# Patient Record
Sex: FEMALE | Race: BLACK OR AFRICAN AMERICAN | NOT HISPANIC OR LATINO | URBAN - METROPOLITAN AREA
[De-identification: names, ages, dates, MRNs, and addresses within clinical notes are randomized per-mention and may not be internally consistent; named-entity substitution may affect disease eponyms.]

---

## 2017-04-20 ENCOUNTER — OFFICE (OUTPATIENT)
Dept: URBAN - METROPOLITAN AREA CLINIC 19 | Facility: CLINIC | Age: 67
End: 2017-04-20

## 2017-04-20 VITALS
HEART RATE: 75 BPM | HEIGHT: 65 IN | WEIGHT: 181 LBS | SYSTOLIC BLOOD PRESSURE: 132 MMHG | DIASTOLIC BLOOD PRESSURE: 89 MMHG

## 2017-04-20 DIAGNOSIS — E66.3 OVERWEIGHT: ICD-10-CM

## 2017-04-20 DIAGNOSIS — Z86.010 PERSONAL HISTORY OF COLONIC POLYPS: ICD-10-CM

## 2017-04-20 DIAGNOSIS — K21.9 GASTRO-ESOPHAGEAL REFLUX DISEASE WITHOUT ESOPHAGITIS: ICD-10-CM

## 2017-04-20 DIAGNOSIS — R94.5 ABNORMAL RESULTS OF LIVER FUNCTION STUDIES: ICD-10-CM

## 2017-04-20 DIAGNOSIS — I10 ESSENTIAL (PRIMARY) HYPERTENSION: ICD-10-CM

## 2017-04-20 DIAGNOSIS — R10.13 EPIGASTRIC PAIN: ICD-10-CM

## 2017-04-20 PROCEDURE — 99214 OFFICE O/P EST MOD 30 MIN: CPT | Performed by: INTERNAL MEDICINE

## 2017-04-20 RX ORDER — SODIUM PICOSULFATE, MAGNESIUM OXIDE, AND ANHYDROUS CITRIC ACID 10; 3.5; 12 MG/16.1G; G/16.1G; G/16.1G
POWDER, METERED ORAL
Qty: 1 | Refills: 0 | Status: COMPLETED
Start: 2017-04-20 | End: 2017-06-28

## 2017-04-22 LAB — CEA: 0.8 NG/ML

## 2017-04-26 ENCOUNTER — OFFICE (OUTPATIENT)
Dept: URBAN - METROPOLITAN AREA CLINIC 22 | Facility: CLINIC | Age: 67
End: 2017-04-26

## 2017-04-26 DIAGNOSIS — R94.5 ABNORMAL RESULTS OF LIVER FUNCTION STUDIES: ICD-10-CM

## 2017-04-26 DIAGNOSIS — R10.13 EPIGASTRIC PAIN: ICD-10-CM

## 2017-04-26 PROCEDURE — 74177 CT ABD & PELVIS W/CONTRAST: CPT | Performed by: INTERNAL MEDICINE

## 2017-06-28 ENCOUNTER — OFFICE (OUTPATIENT)
Dept: URBAN - METROPOLITAN AREA CLINIC 11 | Facility: CLINIC | Age: 67
End: 2017-06-28

## 2017-06-28 ENCOUNTER — AMBULATORY SURGICAL CENTER (OUTPATIENT)
Dept: URBAN - METROPOLITAN AREA SURGERY 2 | Facility: SURGERY | Age: 67
End: 2017-06-28

## 2017-06-28 ENCOUNTER — AMBULATORY SURGICAL CENTER (OUTPATIENT)
Dept: URBAN - METROPOLITAN AREA SURGERY 2 | Facility: SURGERY | Age: 67
End: 2017-06-28
Payer: MEDICARE

## 2017-06-28 VITALS
SYSTOLIC BLOOD PRESSURE: 98 MMHG | HEIGHT: 65 IN | WEIGHT: 184 LBS | DIASTOLIC BLOOD PRESSURE: 50 MMHG | SYSTOLIC BLOOD PRESSURE: 85 MMHG | OXYGEN SATURATION: 95 % | HEIGHT: 65 IN | HEART RATE: 89 BPM | DIASTOLIC BLOOD PRESSURE: 50 MMHG | DIASTOLIC BLOOD PRESSURE: 59 MMHG | OXYGEN SATURATION: 97 % | OXYGEN SATURATION: 95 % | HEART RATE: 97 BPM | DIASTOLIC BLOOD PRESSURE: 59 MMHG | RESPIRATION RATE: 16 BRPM | HEART RATE: 95 BPM | SYSTOLIC BLOOD PRESSURE: 111 MMHG | HEART RATE: 108 BPM | HEART RATE: 95 BPM | WEIGHT: 184 LBS | HEART RATE: 97 BPM | DIASTOLIC BLOOD PRESSURE: 48 MMHG | RESPIRATION RATE: 16 BRPM | TEMPERATURE: 98.6 F | SYSTOLIC BLOOD PRESSURE: 105 MMHG | HEART RATE: 89 BPM | OXYGEN SATURATION: 97 % | SYSTOLIC BLOOD PRESSURE: 98 MMHG | SYSTOLIC BLOOD PRESSURE: 111 MMHG | DIASTOLIC BLOOD PRESSURE: 68 MMHG | TEMPERATURE: 97.5 F | SYSTOLIC BLOOD PRESSURE: 105 MMHG | DIASTOLIC BLOOD PRESSURE: 68 MMHG | SYSTOLIC BLOOD PRESSURE: 85 MMHG | HEART RATE: 108 BPM | TEMPERATURE: 98.6 F | DIASTOLIC BLOOD PRESSURE: 48 MMHG | TEMPERATURE: 97.5 F

## 2017-06-28 DIAGNOSIS — K21.9 GASTRO-ESOPHAGEAL REFLUX DISEASE WITHOUT ESOPHAGITIS: ICD-10-CM

## 2017-06-28 DIAGNOSIS — D12.3 BENIGN NEOPLASM OF TRANSVERSE COLON: ICD-10-CM

## 2017-06-28 DIAGNOSIS — Z86.010 PERSONAL HISTORY OF COLONIC POLYPS: ICD-10-CM

## 2017-06-28 DIAGNOSIS — D12.0 BENIGN NEOPLASM OF CECUM: ICD-10-CM

## 2017-06-28 DIAGNOSIS — R10.13 EPIGASTRIC PAIN: ICD-10-CM

## 2017-06-28 DIAGNOSIS — K57.30 DIVERTICULOSIS OF LARGE INTESTINE WITHOUT PERFORATION OR ABS: ICD-10-CM

## 2017-06-28 DIAGNOSIS — K44.9 DIAPHRAGMATIC HERNIA WITHOUT OBSTRUCTION OR GANGRENE: ICD-10-CM

## 2017-06-28 LAB
HEPATIC FUNCTION PANEL A: ALBUMIN: 4.4 G/DL (ref 3.5–5.2)
HEPATIC FUNCTION PANEL A: ALBUMIN: 4.4 G/DL (ref 3.5–5.2)
HEPATIC FUNCTION PANEL A: ALKALINE PHOSPHATASE: 62 U/L (ref 38–146)
HEPATIC FUNCTION PANEL A: ALKALINE PHOSPHATASE: 62 U/L (ref 38–146)
HEPATIC FUNCTION PANEL A: DIRECT BILIRUBIN: 0.1 MG/DL (ref 0–0.2)
HEPATIC FUNCTION PANEL A: DIRECT BILIRUBIN: 0.1 MG/DL (ref 0–0.2)
HEPATIC FUNCTION PANEL A: SGOT (AST): 21 U/L (ref 13–40)
HEPATIC FUNCTION PANEL A: SGOT (AST): 21 U/L (ref 13–40)
HEPATIC FUNCTION PANEL A: SGPT (ALT): 20 U/L (ref 7–52)
HEPATIC FUNCTION PANEL A: SGPT (ALT): 20 U/L (ref 7–52)
HEPATIC FUNCTION PANEL A: TOTAL BILIRUBIN: 0.6 MG/DL (ref 0.3–1.2)
HEPATIC FUNCTION PANEL A: TOTAL BILIRUBIN: 0.6 MG/DL (ref 0.3–1.2)
HEPATIC FUNCTION PANEL A: TOTAL PROTEIN: 7.2 G/DL (ref 6.4–8.3)
HEPATIC FUNCTION PANEL A: TOTAL PROTEIN: 7.2 G/DL (ref 6.4–8.3)
HEPATITIS A ANTIBODY, POLYV.: HEPATITIS A (POLYV) ANTIBODY: NEGATIVE
HEPATITIS A ANTIBODY, POLYV.: HEPATITIS A (POLYV) ANTIBODY: NEGATIVE
HEPATITIS B CORE AB (POLYV.): HEPATITIS B CORE AB (POLYV): NEGATIVE
HEPATITIS B CORE AB (POLYV.): HEPATITIS B CORE AB (POLYV): NEGATIVE
HEPATITIS B SURFACE ANTIGEN: NEGATIVE
HEPATITIS B SURFACE ANTIGEN: NEGATIVE
HEPATITIS C VIRUS ANTIBODY: HEP C VIRUS AB, 3RD GEN: NEGATIVE
HEPATITIS C VIRUS ANTIBODY: HEP C VIRUS AB, 3RD GEN: NEGATIVE
PROTHROMBIN TIME: INR VALUE: 1.01
PROTHROMBIN TIME: INR VALUE: 1.01
PROTHROMBIN TIME: MEAN NORMAL: 13.1 SECONDS
PROTHROMBIN TIME: MEAN NORMAL: 13.1 SECONDS
PROTHROMBIN TIME: PATIENT: 13.2 SECONDS (ref 12.1–14.2)
PROTHROMBIN TIME: PATIENT: 13.2 SECONDS (ref 12.1–14.2)

## 2017-06-28 PROCEDURE — 88305 TISSUE EXAM BY PATHOLOGIST: CPT | Performed by: INTERNAL MEDICINE

## 2017-06-28 PROCEDURE — 43235 EGD DIAGNOSTIC BRUSH WASH: CPT | Mod: 51 | Performed by: INTERNAL MEDICINE

## 2017-06-28 PROCEDURE — 45385 COLONOSCOPY W/LESION REMOVAL: CPT | Mod: PT | Performed by: INTERNAL MEDICINE

## 2017-06-28 NOTE — SERVICEHPINOTES
67-year-old black female returns for EGD/colonoscopy.  I saw her 4/20/17 when she complained of epigastric pain which awakens her from sleep. She denies nausea, dysphagia, change in bowel habit or overt GI bleeding. She takes Nexium 40 mg/d. She takes no NSAIDs. Routine lab and CT abd/pelvis 4/26/17 were normal.Today's lab finds mild hepatic dysfunction with AST 61 and ALT 61. LFTs were normal on 5/13/15. She denies any hepatobiliary tract disease. She also has a history of rectal cancer and colon polyps. Recent CEA and CT scan are normal. Review of outside records from [place], [place] showed EGD/colonoscopy 10/8/08 found a hiatal hernia and H. pylori gastritis, as well as an 8 cm rectal adenocarcinoma. Preop adjuvant chemoradiation was considered for a PET scan suggesting regional lymphadenopathy, but the tumor had no local invasion pathologically. Oncology felt that the location of the pelvic lymphadenopathy was very unusual for rectal cancer and MRI pelvis was unremarkable. It was felt that this was benign lymphadenopathy from her previous bilateral hip surgery. Therefore, no neoadjuvant therapy was given. It sounds like she had a transanal resection, but I don't have the op/path reports and there is no mention of resection of any kind in the detailed outside records we've received. She has not had a CT scan since her operation in 2009. She has had fairly close postop follow-up. Colonoscopy 10/28/09 found a small polyp which was removed with hot biopsy forceps, but histology was unremarkable (fragments of colon mucosa with cautery effect). A 1.5 cm anastomotic adenomatous colon polyp removed on colonoscopy 11/3/10. Colonoscopy 10/12/11 found only diverticulosis coli. Her last colonoscopy 10/28/14 found a hiatal hernia, H. pylori gastritis, diverticula and a 1.5 cm sigmoid tubular adenomatous colon polyp. Other than a history of diverticulitis, she denies any other GI history. Family history is negative for colon neoplasm.

## 2017-06-28 NOTE — SERVICEHPINOTES
67-year-old black female returns for EGD/colonoscopy.  I saw her 4/20/17 when she complained of epigastric pain which awakens her from sleep. She denies nausea, dysphagia, change in bowel habit or overt GI bleeding. She takes Nexium 40 mg/d. She takes no NSAIDs. Routine lab and CT abd/pelvis 4/26/17 were normal.Today's lab finds mild hepatic dysfunction with AST 61 and ALT 61. LFTs were normal on 5/13/15. She denies any hepatobiliary tract disease. She also has a history of rectal cancer and colon polyps. Recent CEA and CT scan are normal. Review of outside records from [place], [place] showed EGD/colonoscopy 10/8/08 found a hiatal hernia and H. pylori gastritis, as well as an 8 cm rectal adenocarcinoma. Preop adjuvant chemoradiation was considered for a PET scan suggesting regional lymphadenopathy, but the tumor had no local invasion pathologically. Oncology felt that the location of the pelvic lymphadenopathy was very unusual for rectal cancer and MRI pelvis was unremarkable. It was felt that this was benign lymphadenopathy from her previous bilateral hip surgery. Therefore, no neoadjuvant therapy was given. It sounds like she had a transanal resection, but I don't have the op/path reports and there is no mention of resection of any kind in the detailed outside records we've received. She has not had a CT scan since her operation in 2009. She has had fairly close postop follow-up. Colonoscopy 10/28/09 found a small polyp which was removed with hot biopsy forceps, but histology was unremarkable (fragments of colon mucosa with cautery effect). A 1.5 cm anastomotic adenomatous colon polyp removed on colonoscopy 11/3/10. Colonoscopy 10/12/11 found only diverticulosis coli. Her last colonoscopy 10/28/14 found a hiatal hernia, H. pylori gastritis, diverticula and a 1.5 cm sigmoid tubular adenomatous colon polyp. Other than a history of diverticulitis, she denies any other GI history. Family history is negative for colon neoplasm.

## 2018-08-02 ENCOUNTER — OFFICE (OUTPATIENT)
Dept: URBAN - METROPOLITAN AREA CLINIC 19 | Facility: CLINIC | Age: 68
End: 2018-08-02

## 2018-08-02 VITALS
WEIGHT: 183 LBS | SYSTOLIC BLOOD PRESSURE: 135 MMHG | HEART RATE: 72 BPM | DIASTOLIC BLOOD PRESSURE: 85 MMHG | HEIGHT: 65 IN

## 2018-08-02 DIAGNOSIS — E66.9 OBESITY, UNSPECIFIED: ICD-10-CM

## 2018-08-02 DIAGNOSIS — Z86.010 PERSONAL HISTORY OF COLONIC POLYPS: ICD-10-CM

## 2018-08-02 DIAGNOSIS — I10 ESSENTIAL (PRIMARY) HYPERTENSION: ICD-10-CM

## 2018-08-02 DIAGNOSIS — K21.9 GASTRO-ESOPHAGEAL REFLUX DISEASE WITHOUT ESOPHAGITIS: ICD-10-CM

## 2018-08-02 PROCEDURE — 99214 OFFICE O/P EST MOD 30 MIN: CPT | Performed by: INTERNAL MEDICINE

## 2019-11-11 ENCOUNTER — OFFICE (OUTPATIENT)
Dept: URBAN - METROPOLITAN AREA CLINIC 19 | Facility: CLINIC | Age: 69
End: 2019-11-11

## 2019-11-11 VITALS
WEIGHT: 181 LBS | HEIGHT: 65 IN | SYSTOLIC BLOOD PRESSURE: 126 MMHG | HEART RATE: 69 BPM | DIASTOLIC BLOOD PRESSURE: 79 MMHG

## 2019-11-11 DIAGNOSIS — Z86.010 PERSONAL HISTORY OF COLONIC POLYPS: ICD-10-CM

## 2019-11-11 DIAGNOSIS — K21.9 GASTRO-ESOPHAGEAL REFLUX DISEASE WITHOUT ESOPHAGITIS: ICD-10-CM

## 2019-11-11 PROCEDURE — 99213 OFFICE O/P EST LOW 20 MIN: CPT

## 2019-11-11 RX ORDER — ESOMEPRAZOLE MAGNESIUM 40 MG/1
CAPSULE, DELAYED RELEASE ORAL
Qty: 30 | Refills: 11 | Status: ACTIVE

## 2020-08-18 ENCOUNTER — OFFICE (OUTPATIENT)
Dept: URBAN - METROPOLITAN AREA CLINIC 19 | Facility: CLINIC | Age: 70
End: 2020-08-18

## 2020-08-18 VITALS
WEIGHT: 181 LBS | DIASTOLIC BLOOD PRESSURE: 79 MMHG | OXYGEN SATURATION: 98 % | HEIGHT: 65 IN | HEART RATE: 81 BPM | SYSTOLIC BLOOD PRESSURE: 115 MMHG

## 2020-08-18 DIAGNOSIS — R13.10 DYSPHAGIA, UNSPECIFIED: ICD-10-CM

## 2020-08-18 DIAGNOSIS — Z86.010 PERSONAL HISTORY OF COLONIC POLYPS: ICD-10-CM

## 2020-08-18 DIAGNOSIS — K21.9 GASTRO-ESOPHAGEAL REFLUX DISEASE WITHOUT ESOPHAGITIS: ICD-10-CM

## 2020-08-18 PROCEDURE — 99214 OFFICE O/P EST MOD 30 MIN: CPT

## 2020-08-18 RX ORDER — SODIUM PICOSULFATE, MAGNESIUM OXIDE, AND ANHYDROUS CITRIC ACID 10; 3.5; 12 MG/160ML; G/160ML; G/160ML
LIQUID ORAL
Qty: 1 | Refills: 0 | Status: COMPLETED
Start: 2020-08-18 | End: 2020-10-28

## 2020-08-18 NOTE — SERVICEHPINOTES
70-year-old black female returns worsening GERD symptoms, dysphagia and a surveillance colonoscopy.  I last saw her on 11/11/19 for routine annual office visit.  At that time her reflux is well controlled on Nexium 40 mg/d.  She denied any other GI symptoms such as abdominal pain, dysphagia, change in bowel habit or overt GI bleeding.Apparently, she has had to switched from Nexium to the generic Esomeprazole due to her insurance preference.  She has had significant breakthrough for the last several months that she has switched.  She has also had mild progressive dysphagia with solids.  She denies aspiration or unintentional weight loss.  She denies nausea, vomiting, abdominal pain, change in bowel habit or overt GI bleeding.EGD/colonoscopy 6/28/17 found a hiatal hernia, diverticulosis coli and two adenomatous colon polyps. Routine lab and CT abd/pelvis 4/26/17 were normal. She also has a history of rectal cancer and colon polyps. Review of outside records from [place], [place] showed EGD/colonoscopy 10/8/08 found a hiatal hernia and H. pylori gastritis, as well as an 8 cm rectal adenocarcinoma. Preop adjuvant chemoradiation was considered for a PET scan suggesting regional lymphadenopathy, but the tumor had no local invasion pathologically. Oncology felt that the location of the pelvic lymphadenopathy was very unusual for rectal cancer and MRI pelvis was unremarkable. It was felt that this was benign lymphadenopathy from her previous bilateral hip surgery. Therefore, no neoadjuvant therapy was given. It sounds like she had a transanal resection, but I don't have the op/path reports and there is no mention of resection of any kind in the detailed outside records we've received. She has not had a CT scan since her operation in 2009. She has had fairly close postop follow-up. Colonoscopy 10/28/09 found a small polyp which was removed with hot biopsy forceps, but histology was unremarkable (fragments of colon mucosa with cautery effect). A 1.5 cm anastomotic adenomatous colon polyp removed on colonoscopy 11/3/10. Colonoscopy 10/12/11 found only diverticulosis coli. Her last colonoscopy 10/28/14 found a hiatal hernia, H. pylori gastritis, diverticula and a 1.5 cm sigmoid tubular adenomatous colon polyp. Other than a history of diverticulitis, she denies any other GI history. Family history is negative for colon neoplasm.

## 2020-08-18 NOTE — SERVICENOTES
The patient's assessment was reviewed with Dr. Somers and a collaborative plan of care was established.

## 2020-10-28 ENCOUNTER — AMBULATORY SURGICAL CENTER (OUTPATIENT)
Dept: URBAN - METROPOLITAN AREA SURGERY 2 | Facility: SURGERY | Age: 70
End: 2020-10-28
Payer: MEDICARE

## 2020-10-28 ENCOUNTER — AMBULATORY SURGICAL CENTER (OUTPATIENT)
Dept: URBAN - METROPOLITAN AREA SURGERY 2 | Facility: SURGERY | Age: 70
End: 2020-10-28

## 2020-10-28 ENCOUNTER — OFFICE (OUTPATIENT)
Dept: URBAN - METROPOLITAN AREA PATHOLOGY 22 | Facility: PATHOLOGY | Age: 70
End: 2020-10-28

## 2020-10-28 VITALS
HEART RATE: 76 BPM | OXYGEN SATURATION: 97 % | TEMPERATURE: 97.2 F | OXYGEN SATURATION: 100 % | DIASTOLIC BLOOD PRESSURE: 49 MMHG | DIASTOLIC BLOOD PRESSURE: 74 MMHG | HEIGHT: 65 IN | HEART RATE: 71 BPM | TEMPERATURE: 98.2 F | DIASTOLIC BLOOD PRESSURE: 49 MMHG | RESPIRATION RATE: 16 BRPM | TEMPERATURE: 97.2 F | SYSTOLIC BLOOD PRESSURE: 93 MMHG | SYSTOLIC BLOOD PRESSURE: 118 MMHG | DIASTOLIC BLOOD PRESSURE: 74 MMHG | TEMPERATURE: 98.2 F | OXYGEN SATURATION: 97 % | HEIGHT: 65 IN | OXYGEN SATURATION: 98 % | OXYGEN SATURATION: 99 % | SYSTOLIC BLOOD PRESSURE: 93 MMHG | DIASTOLIC BLOOD PRESSURE: 74 MMHG | HEART RATE: 81 BPM | HEART RATE: 79 BPM | SYSTOLIC BLOOD PRESSURE: 89 MMHG | DIASTOLIC BLOOD PRESSURE: 63 MMHG | OXYGEN SATURATION: 98 % | WEIGHT: 181 LBS | DIASTOLIC BLOOD PRESSURE: 54 MMHG | SYSTOLIC BLOOD PRESSURE: 89 MMHG | SYSTOLIC BLOOD PRESSURE: 118 MMHG | SYSTOLIC BLOOD PRESSURE: 97 MMHG | HEART RATE: 81 BPM | OXYGEN SATURATION: 98 % | RESPIRATION RATE: 16 BRPM | DIASTOLIC BLOOD PRESSURE: 63 MMHG | HEART RATE: 71 BPM | SYSTOLIC BLOOD PRESSURE: 97 MMHG | HEART RATE: 79 BPM | DIASTOLIC BLOOD PRESSURE: 54 MMHG | DIASTOLIC BLOOD PRESSURE: 54 MMHG | OXYGEN SATURATION: 100 % | DIASTOLIC BLOOD PRESSURE: 63 MMHG | OXYGEN SATURATION: 97 % | RESPIRATION RATE: 16 BRPM | HEART RATE: 79 BPM | WEIGHT: 181 LBS | OXYGEN SATURATION: 100 % | HEART RATE: 71 BPM | OXYGEN SATURATION: 99 % | HEART RATE: 81 BPM | TEMPERATURE: 97.2 F | SYSTOLIC BLOOD PRESSURE: 118 MMHG | DIASTOLIC BLOOD PRESSURE: 49 MMHG | HEART RATE: 76 BPM | HEART RATE: 76 BPM | OXYGEN SATURATION: 99 % | HEART RATE: 78 BPM | HEART RATE: 78 BPM | HEART RATE: 78 BPM | SYSTOLIC BLOOD PRESSURE: 93 MMHG | HEIGHT: 65 IN | WEIGHT: 181 LBS | SYSTOLIC BLOOD PRESSURE: 97 MMHG | TEMPERATURE: 98.2 F | SYSTOLIC BLOOD PRESSURE: 89 MMHG

## 2020-10-28 DIAGNOSIS — K29.50 UNSPECIFIED CHRONIC GASTRITIS WITHOUT BLEEDING: ICD-10-CM

## 2020-10-28 DIAGNOSIS — Z86.010 PERSONAL HISTORY OF COLONIC POLYPS: ICD-10-CM

## 2020-10-28 DIAGNOSIS — D12.3 BENIGN NEOPLASM OF TRANSVERSE COLON: ICD-10-CM

## 2020-10-28 DIAGNOSIS — D12.4 BENIGN NEOPLASM OF DESCENDING COLON: ICD-10-CM

## 2020-10-28 DIAGNOSIS — K21.9 GASTRO-ESOPHAGEAL REFLUX DISEASE WITHOUT ESOPHAGITIS: ICD-10-CM

## 2020-10-28 DIAGNOSIS — K57.30 DIVERTICULOSIS OF LARGE INTESTINE WITHOUT PERFORATION OR ABS: ICD-10-CM

## 2020-10-28 DIAGNOSIS — R13.10 DYSPHAGIA, UNSPECIFIED: ICD-10-CM

## 2020-10-28 PROBLEM — K63.5 POLYP OF COLON: Status: ACTIVE | Noted: 2020-10-28

## 2020-10-28 PROBLEM — K31.89 OTHER DISEASES OF STOMACH AND DUODENUM: Status: ACTIVE | Noted: 2020-10-28

## 2020-10-28 PROCEDURE — 43239 EGD BIOPSY SINGLE/MULTIPLE: CPT | Mod: 59 | Performed by: INTERNAL MEDICINE

## 2020-10-28 PROCEDURE — 45380 COLONOSCOPY AND BIOPSY: CPT | Mod: PT | Performed by: INTERNAL MEDICINE

## 2020-10-28 PROCEDURE — 88342 IMHCHEM/IMCYTCHM 1ST ANTB: CPT | Performed by: INTERNAL MEDICINE

## 2020-10-28 PROCEDURE — 88305 TISSUE EXAM BY PATHOLOGIST: CPT | Performed by: INTERNAL MEDICINE

## 2020-10-28 PROCEDURE — 88313 SPECIAL STAINS GROUP 2: CPT | Performed by: INTERNAL MEDICINE

## 2020-10-28 PROCEDURE — 43248 EGD GUIDE WIRE INSERTION: CPT | Mod: 51 | Performed by: INTERNAL MEDICINE

## 2020-11-11 ENCOUNTER — OFFICE (OUTPATIENT)
Dept: URBAN - METROPOLITAN AREA CLINIC 19 | Facility: CLINIC | Age: 70
End: 2020-11-11

## 2020-11-11 VITALS
HEIGHT: 65 IN | WEIGHT: 183 LBS | HEART RATE: 70 BPM | DIASTOLIC BLOOD PRESSURE: 84 MMHG | SYSTOLIC BLOOD PRESSURE: 142 MMHG | OXYGEN SATURATION: 98 %

## 2020-11-11 DIAGNOSIS — R13.10 DYSPHAGIA, UNSPECIFIED: ICD-10-CM

## 2020-11-11 DIAGNOSIS — J02.9 ACUTE PHARYNGITIS, UNSPECIFIED: ICD-10-CM

## 2020-11-11 DIAGNOSIS — Z86.010 PERSONAL HISTORY OF COLONIC POLYPS: ICD-10-CM

## 2020-11-11 DIAGNOSIS — K21.9 GASTRO-ESOPHAGEAL REFLUX DISEASE WITHOUT ESOPHAGITIS: ICD-10-CM

## 2020-11-11 PROCEDURE — 99214 OFFICE O/P EST MOD 30 MIN: CPT

## 2020-11-11 RX ORDER — ESOMEPRAZOLE MAGNESIUM 40 MG/1
CAPSULE, DELAYED RELEASE ORAL
Qty: 90 | Refills: 3 | Status: COMPLETED
Start: 2020-08-18 | End: 2022-05-19

## 2020-11-11 NOTE — SERVICEHPINOTES
70-year-old black female returns for routine follow-up of her reflux.I saw her on 8/18/20 for complaints of worsening GERD symptoms, dysphagia and to discuss her surveillance colonoscopy.  Her reflux had previously been well controlled on Nexium 40 mg/d, but was switched to esomeprazole due to her insurance preference.  Since switching to the generic, she was having significant breakthrough.  She was also having mild progressive dysphagia with solids.  She denied any other GI symptoms at the time.  EGD/colonoscopy 10/28/20 found gastritis, diverticulosis coli and removed  2 small adenomatous polyps.  Esophagus was empirically dilated.  Biopsies were negative for eosinophilic esophagitis and H pylori.  She did have some soreness to her throat after the procedures, but this is slowly improved and is almost resolved.  She denies nausea, vomiting, dysphagia, change in bowel habit or overt GI bleeding.EGD/colonoscopy 6/28/17 found a hiatal hernia, diverticulosis coli and two adenomatous colon polyps. Routine lab and CT abd/pelvis 4/26/17 were normal. She also has a history of rectal cancer and colon polyps. Review of outside records from [place], [place] showed EGD/colonoscopy 10/8/08 found a hiatal hernia and H. pylori gastritis, as well as an 8 cm rectal adenocarcinoma. Preop adjuvant chemoradiation was considered for a PET scan suggesting regional lymphadenopathy, but the tumor had no local invasion pathologically. Oncology felt that the location of the pelvic lymphadenopathy was very unusual for rectal cancer and MRI pelvis was unremarkable. It was felt that this was benign lymphadenopathy from her previous bilateral hip surgery. Therefore, no neoadjuvant therapy was given. It sounds like she had a transanal resection, but I don't have the op/path reports and there is no mention of resection of any kind in the detailed outside records we've received. She has not had a CT scan since her operation in 2009. She has had fairly close postop follow-up. Colonoscopy 10/28/09 found a small polyp which was removed with hot biopsy forceps, but histology was unremarkable (fragments of colon mucosa with cautery effect). A 1.5 cm anastomotic adenomatous colon polyp removed on colonoscopy 11/3/10. Colonoscopy 10/12/11 found only diverticulosis coli. Her last colonoscopy 10/28/14 found a hiatal hernia, H. pylori gastritis, diverticula and a 1.5 cm sigmoid tubular adenomatous colon polyp. Other than a history of diverticulitis, she denies any other GI history. Family history is negative for colon neoplasm.     at the time.

## 2021-09-14 ENCOUNTER — OFFICE (OUTPATIENT)
Dept: URBAN - METROPOLITAN AREA CLINIC 19 | Facility: CLINIC | Age: 71
End: 2021-09-14

## 2021-09-14 VITALS
HEART RATE: 68 BPM | DIASTOLIC BLOOD PRESSURE: 79 MMHG | WEIGHT: 183 LBS | SYSTOLIC BLOOD PRESSURE: 131 MMHG | HEIGHT: 65 IN | OXYGEN SATURATION: 96 %

## 2021-09-14 DIAGNOSIS — R14.2 ERUCTATION: ICD-10-CM

## 2021-09-14 DIAGNOSIS — K21.9 GASTRO-ESOPHAGEAL REFLUX DISEASE WITHOUT ESOPHAGITIS: ICD-10-CM

## 2021-09-14 DIAGNOSIS — Z86.010 PERSONAL HISTORY OF COLONIC POLYPS: ICD-10-CM

## 2021-09-14 DIAGNOSIS — R14.0 ABDOMINAL DISTENSION (GASEOUS): ICD-10-CM

## 2021-09-14 LAB
ALLERGEN PROFILE, BASIC FOOD: CLASS DESCRIPTION: (no result)
ALLERGEN PROFILE, BASIC FOOD: F002-IGE MILK: <0.1 KU/L
ALLERGEN PROFILE, BASIC FOOD: F004-IGE WHEAT: <0.1 KU/L
ALLERGEN PROFILE, BASIC FOOD: F008-IGE CORN: <0.1 KU/L
ALLERGEN PROFILE, BASIC FOOD: F013-IGE PEANUT: <0.1 KU/L
ALLERGEN PROFILE, BASIC FOOD: F014-IGE SOYBEAN: <0.1 KU/L
ALLERGEN PROFILE, BASIC FOOD: F026-IGE PORK: <0.1 KU/L
ALLERGEN PROFILE, BASIC FOOD: F027-IGE BEEF: <0.1 KU/L
ALLERGEN PROFILE, BASIC FOOD: F093-IGE CHOCOLATE/CACAO: <0.1 KU/L
ALLERGEN PROFILE, BASIC FOOD: F245-IGE EGG, WHOLE: <0.1 KU/L
ALLERGEN PROFILE, BASIC FOOD: FX02-IGE FOOD MIX (SEAFOODS): NEGATIVE
C-REACTIVE PROTEIN, QUANT: 6 MG/L (ref 0–10)
CBC, PLATELET, NO DIFFERENTIAL: HEMATOCRIT: 37.2 % (ref 34–46.6)
CBC, PLATELET, NO DIFFERENTIAL: HEMOGLOBIN: 12.3 G/DL (ref 11.1–15.9)
CBC, PLATELET, NO DIFFERENTIAL: MCH: 31.8 PG (ref 26.6–33)
CBC, PLATELET, NO DIFFERENTIAL: MCHC: 33.1 G/DL (ref 31.5–35.7)
CBC, PLATELET, NO DIFFERENTIAL: MCV: 96 FL (ref 79–97)
CBC, PLATELET, NO DIFFERENTIAL: PLATELETS: 235 X10E3/UL (ref 150–450)
CBC, PLATELET, NO DIFFERENTIAL: RBC: 3.87 X10E6/UL (ref 3.77–5.28)
CBC, PLATELET, NO DIFFERENTIAL: RDW: 13.7 % (ref 11.7–15.4)
CBC, PLATELET, NO DIFFERENTIAL: WBC: 4.8 X10E3/UL (ref 3.4–10.8)
CELIAC AB TTG DGP TIGA: DEAMIDATED GLIADIN ABS, IGA: 3 UNITS (ref 0–19)
CELIAC AB TTG DGP TIGA: DEAMIDATED GLIADIN ABS, IGG: 2 UNITS (ref 0–19)
CELIAC AB TTG DGP TIGA: IMMUNOGLOBULIN A, QN, SERUM: 232 MG/DL (ref 87–352)
CELIAC AB TTG DGP TIGA: T-TRANSGLUTAMINASE (TTG) IGA: <2 U/ML
CELIAC AB TTG DGP TIGA: T-TRANSGLUTAMINASE (TTG) IGG: 6 U/ML — HIGH (ref 0–5)
COMP. METABOLIC PANEL (14): A/G RATIO: 1.6 (ref 1.2–2.2)
COMP. METABOLIC PANEL (14): ALBUMIN: 4.2 G/DL (ref 3.8–4.8)
COMP. METABOLIC PANEL (14): ALKALINE PHOSPHATASE: 66 IU/L (ref 44–121)
COMP. METABOLIC PANEL (14): ALT (SGPT): 12 IU/L (ref 0–32)
COMP. METABOLIC PANEL (14): AST (SGOT): 16 IU/L (ref 0–40)
COMP. METABOLIC PANEL (14): BILIRUBIN, TOTAL: 0.3 MG/DL (ref 0–1.2)
COMP. METABOLIC PANEL (14): BUN/CREATININE RATIO: 18 (ref 12–28)
COMP. METABOLIC PANEL (14): BUN: 13 MG/DL (ref 8–27)
COMP. METABOLIC PANEL (14): CALCIUM: 9.2 MG/DL (ref 8.7–10.3)
COMP. METABOLIC PANEL (14): CARBON DIOXIDE, TOTAL: 26 MMOL/L (ref 20–29)
COMP. METABOLIC PANEL (14): CHLORIDE: 102 MMOL/L (ref 96–106)
COMP. METABOLIC PANEL (14): CREATININE: 0.71 MG/DL (ref 0.57–1)
COMP. METABOLIC PANEL (14): EGFR IF AFRICN AM: 100 ML/MIN/1.73 (ref 59–?)
COMP. METABOLIC PANEL (14): EGFR IF NONAFRICN AM: 87 ML/MIN/1.73 (ref 59–?)
COMP. METABOLIC PANEL (14): GLOBULIN, TOTAL: 2.7 G/DL (ref 1.5–4.5)
COMP. METABOLIC PANEL (14): GLUCOSE: 106 MG/DL — HIGH (ref 65–99)
COMP. METABOLIC PANEL (14): POTASSIUM: 4 MMOL/L (ref 3.5–5.2)
COMP. METABOLIC PANEL (14): PROTEIN, TOTAL: 6.9 G/DL (ref 6–8.5)
COMP. METABOLIC PANEL (14): SODIUM: 141 MMOL/L (ref 134–144)
SEDIMENTATION RATE-WESTERGREN: 13 MM/HR (ref 0–40)
TSH: 2.29 UIU/ML (ref 0.45–4.5)

## 2021-09-14 PROCEDURE — 99214 OFFICE O/P EST MOD 30 MIN: CPT

## 2021-09-14 RX ORDER — ESOMEPRAZOLE MAGNESIUM 40 MG/1
CAPSULE, DELAYED RELEASE ORAL
Qty: 90 | Refills: 3 | Status: COMPLETED
Start: 2020-08-18 | End: 2022-05-19

## 2021-09-14 NOTE — SERVICEHPINOTES
71-year-old black female returns for complaints of bloating, belching and flatulence in addition to her chronic reflux.I last saw her for routine follow-up of her reflux on 11/11/20.  I had initially seen her on 8/18/20 for complaints of worsening GERD symptoms, dysphagia and to discuss her surveillance colonoscopy.  Her reflux had previously been well controlled on Nexium 40 mg/d, but was switched to a some upper saw all due to her insurance preference.  Since switching to the generic, she reported having significant breakthrough symptoms.  She was also having progressive mild dysphagia with solids.  EGD/colonoscopy 10/28/20 found gastritis, diverticulosis coli and removed 2 small adenomatous polyps.  Esophagus was empirically dilated.  Biopsies were negative for eosinophilic esophagitis and H pylori.Since she was last here, she has had progressive postprandial bloating, increased belching as well as flatulence.  Her reflux is mostly well controlled on esomeprazole 40 mg/d.  She would still like to be switched to generic as she feels this works better for her reflux.  She has had a mild change in her bowel habit, but feels she does not have enough fiber.  She denies diarrhea, constipation or overt GI bleeding.  She denies any dysphagia since she last had her esophagus stretched.  EGD/colonoscopy 6/28/17 found a hiatal hernia, diverticulosis coli and two adenomatous colon polyps. Routine lab and CT abd/pelvis 4/26/17 were normal. She also has a history of rectal cancer and colon polyps. Review of outside records from [place], [place] showed EGD/colonoscopy 10/8/08 found a hiatal hernia and H. pylori gastritis, as well as an 8 cm rectal adenocarcinoma. Preop adjuvant chemoradiation was considered for a PET scan suggesting regional lymphadenopathy, but the tumor had no local invasion pathologically. Oncology felt that the location of the pelvic lymphadenopathy was very unusual for rectal cancer and MRI pelvis was unremarkable. It was felt that this was benign lymphadenopathy from her previous bilateral hip surgery. Therefore, no neoadjuvant therapy was given. It sounds like she had a transanal resection, but I don't have the op/path reports and there is no mention of resection of any kind in the detailed outside records we've received. She has not had a CT scan since her operation in 2009. She has had fairly close postop follow-up. Colonoscopy 10/28/09 found a small polyp which was removed with hot biopsy forceps, but histology was unremarkable (fragments of colon mucosa with cautery effect). A 1.5 cm anastomotic adenomatous colon polyp removed on colonoscopy 11/3/10. Colonoscopy 10/12/11 found only diverticulosis coli. Her last colonoscopy 10/28/14 found a hiatal hernia, H. pylori gastritis, diverticula and a 1.5 cm sigmoid tubular adenomatous colon polyp. Other than a history of diverticulitis, she denies any other GI history. Family history is negative for colon neoplasm. at the time.

## 2022-05-19 ENCOUNTER — OFFICE (OUTPATIENT)
Dept: URBAN - METROPOLITAN AREA CLINIC 19 | Facility: CLINIC | Age: 72
End: 2022-05-19

## 2022-05-19 VITALS
HEART RATE: 68 BPM | OXYGEN SATURATION: 100 % | WEIGHT: 178 LBS | SYSTOLIC BLOOD PRESSURE: 138 MMHG | DIASTOLIC BLOOD PRESSURE: 76 MMHG | HEIGHT: 65 IN

## 2022-05-19 DIAGNOSIS — K21.9 GASTRO-ESOPHAGEAL REFLUX DISEASE WITHOUT ESOPHAGITIS: ICD-10-CM

## 2022-05-19 DIAGNOSIS — Z86.010 PERSONAL HISTORY OF COLONIC POLYPS: ICD-10-CM

## 2022-05-19 DIAGNOSIS — R14.2 ERUCTATION: ICD-10-CM

## 2022-05-19 DIAGNOSIS — R19.4 CHANGE IN BOWEL HABIT: ICD-10-CM

## 2022-05-19 DIAGNOSIS — K30 FUNCTIONAL DYSPEPSIA: ICD-10-CM

## 2022-05-19 DIAGNOSIS — R10.10 UPPER ABDOMINAL PAIN, UNSPECIFIED: ICD-10-CM

## 2022-05-19 PROCEDURE — 99214 OFFICE O/P EST MOD 30 MIN: CPT

## 2022-05-19 RX ORDER — ESOMEPRAZOLE MAGNESIUM 40 MG/1
CAPSULE, DELAYED RELEASE ORAL
Qty: 90 | Refills: 3 | Status: COMPLETED
End: 2022-05-19

## 2022-05-19 RX ORDER — PANTOPRAZOLE SODIUM 40 MG/1
40 TABLET, DELAYED RELEASE ORAL
Qty: 30 | Refills: 11 | Status: COMPLETED
Start: 2022-05-19 | End: 2023-04-03

## 2022-05-19 NOTE — SERVICEHPINOTES
72-year-old black female returns for complaints of worsening reflux and an upset stomach along with bloating, gas and flatulence.
zi Contreras had initially seen her on 8/18/20 for complaints of worsening GERD symptoms, dysphagia and to discuss her surveillance colonoscopy.  Her reflux had previously been well controlled on Nexium 40 mg/d, but was switched to Esomeprazole 40 mg/d due to her insurance preference.  Since switching to the generic, she reported having significant breakthrough symptoms.  She was also having progressive mild dysphagia with solids.  EGD/colonoscopy 10/28/20 found gastritis, diverticulosis coli and removed 2 small adenomatous polyps.  Esophagus was empirically dilated.  Biopsies were negative for eosinophilic esophagitis and H pylori.  she continued to have progressive postprandial bloating, increased belching as well as flatulence.  Her reflux did eventually become well managed on esomeprazole 40 mg/d.  She still wanted to switch to a generic as she felt this worked better for her reflux.  She has had a mild change in her bowel habit, but feels she does not have enough fiber. I last saw her 9/14/21 for complaints of bloating, belching and flatulence in addition to chronic reflux.  Routine lab work including celiac antibodies and basic food allergy panel, was unremarkable.zi Corley returns today for worsening reflux on the Esomeprazole 40 mg/d, an "upset stomach", bloating, gas, and flatulence.  She does feel like she has worsening regurgitation.  She had been on Nexium in the past, but her insurance demanded she switched to the generic which has never seem to work as well.  She does have occasional nausea, but denies any vomiting.  She denies dysphagia or overt GI bleeding.  She does have intermittent episodes of upper abdominal pain that seems to be more acute after she eats.  She also has increased bloating and glass and flatulence.  Her stools seem to be "powdery" when flushed in the toilet bowl.   EGD/colonoscopy 6/28/17 found a hiatal hernia, diverticulosis coli and two adenomatous colon polyps. Routine lab and CT abd/pelvis 4/26/17 were normal.She also has a history of rectal cancer and colon polyps. Review of outside records from [place], [place] showed EGD/colonoscopy 10/8/08 found a hiatal hernia and H. pylori gastritis, as well as an 8 cm rectal adenocarcinoma. Preop adjuvant chemoradiation was considered for a PET scan suggesting regional lymphadenopathy, but the tumor had no local invasion pathologically. Oncology felt that the location of the pelvic lymphadenopathy was very unusual for rectal cancer and MRI pelvis was unremarkable. It was felt that this was benign lymphadenopathy from her previous bilateral hip surgery. Therefore, no neoadjuvant therapy was given. It sounds like she had a transanal resection, but I don't have the op/path reports and there is no mention of resection of any kind in the detailed outside records we've received. She has not had a CT scan since her operation in 2009.She has had fairly close postop follow-up. Colonoscopy 10/28/09 found a small polyp which was removed with hot biopsy forceps, but histology was unremarkable (fragments of colon mucosa with cautery effect). A 1.5 cm anastomotic adenomatous colon polyp removed on colonoscopy 11/3/10. Colonoscopy 10/12/11 found only diverticulosis coli. Her last colonoscopy 10/28/14 found a hiatal hernia, H. pylori gastritis, diverticula and a 1.5 cm sigmoid tubular adenomatous colon polyp. Other than a history of diverticulitis, she denies any other GI history. Family history is negative for colon neoplasm. at the time.

## 2025-02-28 ENCOUNTER — OFFICE (OUTPATIENT)
Dept: URBAN - METROPOLITAN AREA CLINIC 19 | Facility: CLINIC | Age: 75
End: 2025-02-28
Payer: MEDICARE

## 2025-02-28 VITALS
HEIGHT: 65 IN | HEART RATE: 74 BPM | OXYGEN SATURATION: 99 % | SYSTOLIC BLOOD PRESSURE: 129 MMHG | DIASTOLIC BLOOD PRESSURE: 103 MMHG | DIASTOLIC BLOOD PRESSURE: 101 MMHG | WEIGHT: 176 LBS | SYSTOLIC BLOOD PRESSURE: 136 MMHG

## 2025-02-28 DIAGNOSIS — R19.4 CHANGE IN BOWEL HABIT: ICD-10-CM

## 2025-02-28 DIAGNOSIS — K80.20 CALCULUS OF GALLBLADDER WITHOUT CHOLECYSTITIS WITHOUT OBSTRU: ICD-10-CM

## 2025-02-28 DIAGNOSIS — C20 MALIGNANT NEOPLASM OF RECTUM: ICD-10-CM

## 2025-02-28 DIAGNOSIS — K21.9 GASTRO-ESOPHAGEAL REFLUX DISEASE WITHOUT ESOPHAGITIS: ICD-10-CM

## 2025-02-28 PROCEDURE — 99215 OFFICE O/P EST HI 40 MIN: CPT | Performed by: INTERNAL MEDICINE

## 2025-02-28 RX ORDER — SODIUM SULFATE, POTASSIUM SULFATE, MAGNESIUM SULFATE 17.5; 3.13; 1.6 G/ML; G/ML; G/ML
SOLUTION, CONCENTRATE ORAL
Qty: 354 | Refills: 0 | Status: ACTIVE
Start: 2025-02-28

## 2025-04-02 ENCOUNTER — AMBULATORY SURGICAL CENTER (OUTPATIENT)
Dept: URBAN - METROPOLITAN AREA SURGERY 2 | Facility: SURGERY | Age: 75
End: 2025-04-02
Payer: MEDICARE

## 2025-04-02 ENCOUNTER — OFFICE (OUTPATIENT)
Dept: URBAN - METROPOLITAN AREA PATHOLOGY 12 | Facility: PATHOLOGY | Age: 75
End: 2025-04-02
Payer: MEDICARE

## 2025-04-02 VITALS
WEIGHT: 174 LBS | RESPIRATION RATE: 16 BRPM | DIASTOLIC BLOOD PRESSURE: 70 MMHG | OXYGEN SATURATION: 98 % | SYSTOLIC BLOOD PRESSURE: 128 MMHG | OXYGEN SATURATION: 99 % | HEART RATE: 73 BPM | OXYGEN SATURATION: 97 % | DIASTOLIC BLOOD PRESSURE: 88 MMHG | RESPIRATION RATE: 16 BRPM | RESPIRATION RATE: 18 BRPM | HEART RATE: 69 BPM | HEART RATE: 69 BPM | HEIGHT: 65 IN | SYSTOLIC BLOOD PRESSURE: 116 MMHG | HEIGHT: 65 IN | OXYGEN SATURATION: 98 % | HEART RATE: 73 BPM | DIASTOLIC BLOOD PRESSURE: 68 MMHG | SYSTOLIC BLOOD PRESSURE: 116 MMHG | RESPIRATION RATE: 18 BRPM | SYSTOLIC BLOOD PRESSURE: 106 MMHG | WEIGHT: 174 LBS | HEART RATE: 62 BPM | HEART RATE: 62 BPM | OXYGEN SATURATION: 97 % | DIASTOLIC BLOOD PRESSURE: 65 MMHG | HEART RATE: 72 BPM | DIASTOLIC BLOOD PRESSURE: 65 MMHG | TEMPERATURE: 98.2 F | DIASTOLIC BLOOD PRESSURE: 88 MMHG | SYSTOLIC BLOOD PRESSURE: 106 MMHG | SYSTOLIC BLOOD PRESSURE: 128 MMHG | DIASTOLIC BLOOD PRESSURE: 70 MMHG | TEMPERATURE: 98.2 F | DIASTOLIC BLOOD PRESSURE: 68 MMHG | HEART RATE: 72 BPM | OXYGEN SATURATION: 99 %

## 2025-04-02 DIAGNOSIS — D12.3 BENIGN NEOPLASM OF TRANSVERSE COLON: ICD-10-CM

## 2025-04-02 DIAGNOSIS — R19.4 CHANGE IN BOWEL HABIT: ICD-10-CM

## 2025-04-02 DIAGNOSIS — Z85.048 PERSONAL HISTORY OF OTHER MALIGNANT NEOPLASM OF RECTUM, RECT: ICD-10-CM

## 2025-04-02 DIAGNOSIS — D12.0 BENIGN NEOPLASM OF CECUM: ICD-10-CM

## 2025-04-02 DIAGNOSIS — Z86.0100 PERSONAL HISTORY OF COLON POLYPS, UNSPECIFIED: ICD-10-CM

## 2025-04-02 DIAGNOSIS — Z09 ENCOUNTER FOR FOLLOW-UP EXAMINATION AFTER COMPLETED TREATMEN: ICD-10-CM

## 2025-04-02 PROCEDURE — 45380 COLONOSCOPY AND BIOPSY: CPT | Mod: 59 | Performed by: INTERNAL MEDICINE

## 2025-04-02 PROCEDURE — 45385 COLONOSCOPY W/LESION REMOVAL: CPT | Performed by: INTERNAL MEDICINE

## 2025-04-02 PROCEDURE — 88305 TISSUE EXAM BY PATHOLOGIST: CPT | Performed by: STUDENT IN AN ORGANIZED HEALTH CARE EDUCATION/TRAINING PROGRAM

## 2025-04-03 PROBLEM — Z83.71 FAMILY HISTORY OF COLON POLYPS: Status: ACTIVE | Noted: 2025-04-02

## 2025-04-03 LAB
GASTRO ONE PATHOLOGY: PDF REPORT: (no result)
GASTRO ONE PATHOLOGY: PDF REPORT: (no result)